# Patient Record
(demographics unavailable — no encounter records)

---

## 2025-04-28 NOTE — PHYSICAL EXAM
[de-identified] : General: NAD HEENT: NC/AT, EOMI, PERRLA, pharynx moist and pink, no exudate. Neck: supple, no JVD. CVS: S1, S2 normal, RRR, no m/g/r Resp: CTA b/l, no wheeze/rale/rhonchi Abdomen: soft, NT/ND, positive bowel sounds. no HSM. Extremities: no edema, peripheral pulses 2+ bilaterally.  Neuro: aaox3, 5/5 motor strength in all 4 extremities. normal sensation, normal gait.  Psych: normal affect, no SI/HI.

## 2025-04-28 NOTE — HISTORY OF PRESENT ILLNESS
[No Pertinent Cardiac History] : no history of aortic stenosis, atrial fibrillation, coronary artery disease, recent myocardial infarction, or implantable device/pacemaker [Asthma] : asthma [Moderate (4-6 METs)] : Moderate (4-6 METs) [FreeTextEntry1] : R hip replacement [FreeTextEntry2] : 5/5/25 [FreeTextEntry3] : Dr Michael [FreeTextEntry4] : 49 y/o F PMhx prediabetes, asthma, elevated TSH, right hip OA here for pre-op clearance  On estradiol patches(advised to hold) IUD progesterone (should call her gyn for further advice )

## 2025-04-28 NOTE — ASSESSMENT
[Patient Optimized for Surgery] : Patient optimized for surgery [FreeTextEntry4] : I have advised the patient to avoid aspirin and anti inflammatories and all vitamins and supplements for one week prior to surgery. No CP, SOB  Mets>4 Ekg with no significant ST segment changes RCRI: Class II Medically Stable, may proceed with proposed procedure.w/u further work up. Risks and benefit of procedure discussed with patient. Recent labs reviewed and discussed with patient.

## 2025-04-28 NOTE — HISTORY OF PRESENT ILLNESS
[No Pertinent Cardiac History] : no history of aortic stenosis, atrial fibrillation, coronary artery disease, recent myocardial infarction, or implantable device/pacemaker [Asthma] : asthma [Moderate (4-6 METs)] : Moderate (4-6 METs) [FreeTextEntry1] : R hip replacement [FreeTextEntry2] : 5/5/25 [FreeTextEntry3] : Dr Michael [FreeTextEntry4] : 51 y/o F PMhx prediabetes, asthma, elevated TSH, right hip OA here for pre-op clearance  On estradiol patches(advised to hold) IUD progesterone (should call her gyn for further advice )

## 2025-04-28 NOTE — PHYSICAL EXAM
[de-identified] : General: NAD HEENT: NC/AT, EOMI, PERRLA, pharynx moist and pink, no exudate. Neck: supple, no JVD. CVS: S1, S2 normal, RRR, no m/g/r Resp: CTA b/l, no wheeze/rale/rhonchi Abdomen: soft, NT/ND, positive bowel sounds. no HSM. Extremities: no edema, peripheral pulses 2+ bilaterally.  Neuro: aaox3, 5/5 motor strength in all 4 extremities. normal sensation, normal gait.  Psych: normal affect, no SI/HI.